# Patient Record
Sex: FEMALE | Race: WHITE | Employment: OTHER | URBAN - METROPOLITAN AREA
[De-identification: names, ages, dates, MRNs, and addresses within clinical notes are randomized per-mention and may not be internally consistent; named-entity substitution may affect disease eponyms.]

---

## 2021-03-02 ENCOUNTER — HOSPITAL ENCOUNTER (OUTPATIENT)
Dept: PREADMISSION TESTING | Age: 66
Discharge: HOME | End: 2021-03-02
Payer: MEDICARE

## 2021-03-02 PROCEDURE — U0005 INFEC AGEN DETEC AMPLI PROBE: HCPCS

## 2021-03-03 LAB — SARS-COV-2, COV2NT: NOT DETECTED

## 2021-03-05 RX ORDER — SODIUM CHLORIDE 0.9 % (FLUSH) 0.9 %
5-40 SYRINGE (ML) INJECTION AS NEEDED
Status: CANCELLED | OUTPATIENT
Start: 2021-03-05

## 2021-03-05 RX ORDER — SODIUM CHLORIDE 0.9 % (FLUSH) 0.9 %
5-40 SYRINGE (ML) INJECTION EVERY 8 HOURS
Status: CANCELLED | OUTPATIENT
Start: 2021-03-05

## 2021-03-05 RX ORDER — DEXTROMETHORPHAN/PSEUDOEPHED 2.5-7.5/.8
1.2 DROPS ORAL
Status: CANCELLED | OUTPATIENT
Start: 2021-03-05

## 2021-03-05 RX ORDER — ATROPINE SULFATE 0.1 MG/ML
0.5 INJECTION INTRAVENOUS
Status: CANCELLED | OUTPATIENT
Start: 2021-03-05 | End: 2021-03-06

## 2021-03-05 RX ORDER — EPINEPHRINE 0.1 MG/ML
1 INJECTION INTRACARDIAC; INTRAVENOUS
Status: CANCELLED | OUTPATIENT
Start: 2021-03-05 | End: 2021-03-06

## 2021-03-05 RX ORDER — DIPHENHYDRAMINE HYDROCHLORIDE 50 MG/ML
50 INJECTION, SOLUTION INTRAMUSCULAR; INTRAVENOUS ONCE
Status: CANCELLED | OUTPATIENT
Start: 2021-03-05 | End: 2021-03-05

## 2021-03-08 ENCOUNTER — HOSPITAL ENCOUNTER (OUTPATIENT)
Age: 66
Setting detail: OUTPATIENT SURGERY
Discharge: HOME | End: 2021-03-08
Attending: INTERNAL MEDICINE | Admitting: INTERNAL MEDICINE
Payer: MEDICARE

## 2021-03-08 VITALS
TEMPERATURE: 98.2 F | WEIGHT: 166.3 LBS | DIASTOLIC BLOOD PRESSURE: 51 MMHG | HEIGHT: 61 IN | RESPIRATION RATE: 15 BRPM | BODY MASS INDEX: 31.4 KG/M2 | HEART RATE: 75 BPM | SYSTOLIC BLOOD PRESSURE: 109 MMHG | OXYGEN SATURATION: 94 %

## 2021-03-08 LAB — GLUCOSE BLD STRIP.AUTO-MCNC: 96 MG/DL (ref 70–110)

## 2021-03-08 PROCEDURE — 82962 GLUCOSE BLOOD TEST: CPT

## 2021-03-08 PROCEDURE — 77030040361 HC SLV COMPR DVT MDII -B: Performed by: INTERNAL MEDICINE

## 2021-03-08 PROCEDURE — 76040000008

## 2021-03-08 PROCEDURE — 77030039961 HC KT CUST COLON BSC -D: Performed by: INTERNAL MEDICINE

## 2021-03-08 PROCEDURE — 76040000008: Performed by: INTERNAL MEDICINE

## 2021-03-08 PROCEDURE — 77030021593 HC FCPS BIOP ENDOSC BSC -A: Performed by: INTERNAL MEDICINE

## 2021-03-08 PROCEDURE — 99153 MOD SED SAME PHYS/QHP EA: CPT | Performed by: INTERNAL MEDICINE

## 2021-03-08 PROCEDURE — G0500 MOD SEDAT ENDO SERVICE >5YRS: HCPCS | Performed by: INTERNAL MEDICINE

## 2021-03-08 PROCEDURE — 88305 TISSUE EXAM BY PATHOLOGIST: CPT

## 2021-03-08 PROCEDURE — 2709999900 HC NON-CHARGEABLE SUPPLY: Performed by: INTERNAL MEDICINE

## 2021-03-08 PROCEDURE — 74011250636 HC RX REV CODE- 250/636: Performed by: INTERNAL MEDICINE

## 2021-03-08 RX ORDER — METOPROLOL SUCCINATE 25 MG/1
25 TABLET, EXTENDED RELEASE ORAL DAILY
COMMUNITY
End: 2021-07-06

## 2021-03-08 RX ORDER — NALOXONE HYDROCHLORIDE 0.4 MG/ML
0.4 INJECTION, SOLUTION INTRAMUSCULAR; INTRAVENOUS; SUBCUTANEOUS
Status: DISCONTINUED | OUTPATIENT
Start: 2021-03-08 | End: 2021-03-08 | Stop reason: HOSPADM

## 2021-03-08 RX ORDER — ATORVASTATIN CALCIUM 20 MG/1
20 TABLET, FILM COATED ORAL DAILY
COMMUNITY

## 2021-03-08 RX ORDER — MONTELUKAST SODIUM 10 MG/1
10 TABLET ORAL DAILY
COMMUNITY

## 2021-03-08 RX ORDER — GLIMEPIRIDE 4 MG/1
4 TABLET ORAL
COMMUNITY

## 2021-03-08 RX ORDER — SODIUM CHLORIDE 9 MG/ML
1000 INJECTION, SOLUTION INTRAVENOUS CONTINUOUS
Status: DISCONTINUED | OUTPATIENT
Start: 2021-03-08 | End: 2021-03-08 | Stop reason: HOSPADM

## 2021-03-08 RX ORDER — LEVOTHYROXINE SODIUM 150 UG/1
150 TABLET ORAL
COMMUNITY

## 2021-03-08 RX ORDER — WARFARIN SODIUM 5 MG/1
5 TABLET ORAL DAILY
COMMUNITY

## 2021-03-08 RX ORDER — MIDAZOLAM HYDROCHLORIDE 1 MG/ML
.25-5 INJECTION, SOLUTION INTRAMUSCULAR; INTRAVENOUS
Status: DISCONTINUED | OUTPATIENT
Start: 2021-03-08 | End: 2021-03-08 | Stop reason: HOSPADM

## 2021-03-08 RX ORDER — ENOXAPARIN SODIUM 100 MG/ML
40 INJECTION SUBCUTANEOUS EVERY 12 HOURS
COMMUNITY

## 2021-03-08 RX ORDER — METFORMIN HYDROCHLORIDE 500 MG/1
500 TABLET ORAL
COMMUNITY

## 2021-03-08 RX ORDER — FLUMAZENIL 0.1 MG/ML
0.2 INJECTION INTRAVENOUS
Status: DISCONTINUED | OUTPATIENT
Start: 2021-03-08 | End: 2021-03-08 | Stop reason: HOSPADM

## 2021-03-08 RX ORDER — FENTANYL CITRATE 50 UG/ML
100 INJECTION, SOLUTION INTRAMUSCULAR; INTRAVENOUS
Status: DISCONTINUED | OUTPATIENT
Start: 2021-03-08 | End: 2021-03-08 | Stop reason: HOSPADM

## 2021-03-08 RX ADMIN — SODIUM CHLORIDE 1000 ML: 900 INJECTION, SOLUTION INTRAVENOUS at 11:37

## 2021-03-08 NOTE — H&P
Assessment/Plan  # Detail Type Description    1. Assessment Early satiety (R68.81). Patient Plan EGD ordered with Dr. Makayla Nance [facility] to evaluate upper GI tract for abnormalities that may explain symptoms, with biopsies as indicated. Explained risks of procedure to include bleeding, infection, reaction to sedation, and perforation with possible need for admission to the hospital, and in the most extensive of  circumstances, the patient may require surgery. Pt verbalized understanding of these risks and is agreeable with this procedure. Plan Orders Further diagnostic evaluations ordered today include(s) UPPER GI ENDOSCOPY, DIAGNOSIS to be performed. 2. Assessment Abnormal weight loss (R63.4). Patient Plan See above. 3. Assessment Diarrhea (R19.7). Patient Plan Stool studies ordered for diarrhea workup. Pt states the diarrhea began prior to entering the hospital; however, prior to procedures, need to rule out C.diff infection in context of recent 6 day hospital admission to [facility] for chest pain R/T GERD. Plan Orders C difficile Toxins A+B, EIA to be performed. , C-Reactive Protein, Quant to be performed. , Calprotectin, Fecal to be performed., Giardia, EIA; Ova/Parasite to be performed., Occult Blood, Fecal, IA to be performed., Ova + Parasite Exam to be performed. , Pancreatic Elastase, Fecal to be performed. and Stool Culture to be performed. 4. Assessment Personal history of colonic polyps (Z86.010). Patient Plan She is here 3 years overdue, for 3 year Recall for the surveillance of colonic adenomas last addressed in 2014 by Dr. Makayla Nance in the hospital setting at [facility]. One semi-pedunculated polyp (tubular adenoma) found and removed, and moderately severe diverticulosis with myochosis noted on this exam. Pt is aware that she is overdue for surveillance colonoscopy. This will be her second colonoscopy.     Second colonoscopy ordered with Dr. Kathrin Cespedes @[facility], with Suprep bowel prep. Stressed importance of following all bowel preparation instructions. Explained the procedure to the patient including all risks and benefits. These risks consist of missed lesions on exam, bleeding, and bowel perforation with possible need for admission to the hospital, and in the most extensive of  circumstances, the patient may require surgery. Pt verbalized understanding of these risks and is agreeable with this procedure. Plan Orders Further diagnostic evaluations ordered today include(s) DIAGNOSTIC COLONOSCOPY to be performed. 5. Assessment Type 2 diabetes mellitus without complication, without long-term current use of insulin (E11.9). Patient Plan Patient is advised not to take pills for diabetes the day ahead of the procedures  patient is advised (if insulin dependent) to take half of the usual long acting insulin the day before procedures (or as advised by the prescribing provider), and to follow  accuchecks closely, 'rescuing' for a blood sugar below 90.         6. Assessment TIA (transient ischemic attack) (G45.9). Patient Plan Latent stroke history from 8/2018, Coumadin used for anticoagulation for stroke prophylaxis as well as for cardiac indications. 7. Assessment Critical aortic valve stenosis (I35.0). Patient Plan Cardiac catheterization performed on 11/18/2020, found to be normal with EF of 55% confirmed on Echocardiogram. Will upload [facility] records to chart including hospital admission discharge summary, Cardiology consult note, and reports from Echo (11/16), Stress test (11/16), and Cardiac catheterization (11/18). Per 2015 ASGE guidelines, no antibiotics are required prior to procedures, as she has no history of infectious endocarditis. Patient is advised to Synthroid and Cardiac meds the am of procedures with sip clear liquid after prep         8.  Assessment S/P 21mm On-X Mechanical Valve AVR (aortic valve replacement) 10/15/12 (Z95.4). Patient Plan Hold Coumadin 5 days prior to procedures, pt to consult with Dr. Ching File regarding Lovenox bridge. Will request latest Cardiology note from Dr. Ching File. 9. Assessment Asthma, well controlled (J45.909). Patient Plan Bring all inhalers to procedures. 10. Assessment Hypothyroidism (acquired) (E03.9). Patient Plan Patient is advised to Synthroid and Cardiac meds the am of procedure with sip clear liquid after prep                 This 76year old  patient was referred by Krista Parker. This 76year old female presents for Hx polyp/colon cancer, Weight Loss and Diarrhea. History of Present Illness:  1. Hx polyp/colon cancer   Prior screening:  colonoscopy. Denies risk factors. Associated symptoms include abdominal pain, change in bowel habits, constipation, decreased appetite, diarrhea, nausea, vomiting and weight loss. Pertinent negatives include change in stool caliber, melena, rectal bleeding and weight gain. Additional information: No family history of colon cancer, last colonoscopy was 12/4/14 hx of polyps adenomas and BM 2x daily . father prostate cancer. for two days pt was constipated. pt lost around 12 lbs over two months. 12/4/2014 - Colonoscopy  Dr. Mónica Suresh  1.8cm Semi-pedunculated polyp, hot snared - TUBULAR ADENOMA  Moderately severe diverticulosis w/ myochosis in sigmoid colon  High fiber diet, 3 year Recall  2. Weight Loss   The patient has lost 25lbs (11.36kgs) over a period of 2 month(s). The patient is losing weight. The context of the weight loss includes Recent hospital admission. Factors resulting in weight stabilization or gain include: medications and Metformin. The risk factors for weight loss include diabetes and history of stroke. Associated symptoms include constipation, decreased appetite, diaphoresis, diarrhea, vomiting, Early satiety, nausea, vertigo, lightheadedness, SOB and decreased appetite. Pertinent negatives include cold intolerance, heat intolerance and irregular heartbeat/palpitations. Additional information: Appetite improved after Cortisone shot, and decreased dosage of Metformin. SOB has also since improved. 3.  Diarrhea   Onset: 2 months ago. Severity level is: mild-moderate. Stool frequency: 3 times a day. The  describes it as loose, watery and brown. It occurs daily. The problem is improving. She denies aggravating factors. Associated symptoms include change in appetite, cramping (abdominal), flatulence and Belching. Pertinent negatives include abdominal pain, anorexia, bloating, blood in stool, decreased urine output, distention (abdominal), fecal incontinence, fever, joint pain, nausea, rash, tenesmus, vomiting and weight loss. Additional information: no family history of colon cancer, Since cortisone shot and and decreased Metformin dosage she has had constipation for 2 days.           PROBLEM LIST:     Problem Description Onset Date Chronic Clinical Status Notes   Asthma, well controlled 11/12/2018 N     TIA (transient ischemic attack) 08/22/2018 N     S/P 21mm On-X Mechanical Valve AVR (aortic valve replacement) 10/15/12 10/15/2012 N     Type 2 diabetes mellitus without complication, without long-term current use of insulin 10/11/2012 N     Obesity (BMI 30-39.9) 10/11/2012 N     Hypothyroidism (acquired) 10/11/2012 N     Reactive airway disease 10/09/2012 N     Critical aortic valve stenosis 10/11/2012 N     Unstable angina 11/14/2020 N     Cerebrovascular accident (CVA) due to embolism 11/14/2020 N               PAST MEDICAL/SURGICAL HISTORY   (Detailed)    Disease/disorder Onset Date Management Date Comments   Stroke 08/2018 Coumadin     1.8cm semi-pedunculated tubular adenoma 12/04/2014 Hot snare polypectomy     Diverticular disease 2014      CAD  Aortic valve replacement, Coumadin 10/15/2012    Vertigo         C-Sections (x5)     Hypothyroidism  Synthroid     Diabetes Metformin, Glipizide, Xanthic once a week       BTL           Family History  (Detailed)  Relationship Family Member Name  Age at Death Condition Onset Age Cause of Death   Father    Prostate cancer  N   Natural Father    Heart Failure  N   Natural Father    Kidney Disease  N   Natural Mother    Emphysema  N         Social History:  (Detailed)  Tobacco use reviewed. Preferred language is [place]. MARITAL STATUS/FAMILY/SOCIAL SUPPORT  Marital status:    CHILDREN  Does not have children. Smoking status: Never smoker. ALCOHOL  There is a history of alcohol use. Type: Beer. 1 glass consumed rarely. CAFFEINE  The patient uses caffeine: soda - 2 cups a day. LIFESTYLE  Moderate activity level. Health club member. Exercise includes weights. Exercises 3-4 times a week. SLEEP PATTERNS  Patient has no changes to sleep patterns. Medications (active prior to today)  Medication Name Sig Description Start Date Stop Date Refilled Rx Elsewhere   Pain Relief Regular Strength 325 mg tablet Take 1 Tab by Mouth Every 4 Hours As Needed. 10/07/2018 2020  Y   Proventil HFA 90 mcg/actuation aerosol inhaler Take 2 Puffs inhaled by mouth Every 4 Hours As Needed for Wheezing or Shortness of Breath. 2020  Y   aspirin 81 mg chewable tablet Take 1 Tab by Mouth Once a Day. 2018  Y   Lipitor 40 mg tablet Take 1 Tab by Mouth Every Night at Bedtime. 2018   Y   All Day Allergy (cetirizine) 10 mg tablet Take 10 mg by Mouth Daily as needed for Other (allergies). /2020  Y   Lovenox 150 mg/mL subcutaneous syringe Inject 0.9 mL beneath the skin Every 24 Hours.  2020   Y   Amaryl 4 mg tablet Take 4 mg by Mouth Daily in A.M. /2020  Y   Synthroid 175 mcg tablet Take 175 mcg by Mouth Once a Day. 2020  Y   Glucophage 500 mg tablet Take 1,000 mg by Mouth 2 Times Daily with Meals. 2020  Y   Toprol XL 25 mg tablet,extended release Take 25 mg by Mouth Once a Day. // 12/30/2020  Y   Singulair 10 mg tablet Take 1 Tab by Mouth Once a Day. 08/23/2017   Y   Protonix 40 mg tablet,delayed release Take 40 mg by Mouth Once a Day. // 12/30/2020  Y   Ozempic 0.25 mg or 0.5 mg (2 mg/1.5 mL) subcutaneous pen injector Inject 0.5 mg beneath the skin Every 7 Days. // 12/30/2020  Y   warfarin (COUMADIN) 5 mg PO TABS Take 5 mg by Mouth See Admin Instrs. Take 1 tab (5mg) every day except take 0.5 tab (2.5mg) on Tuesdays and Thursdays. //   Y   levothyroxine 150 mcg capsule take 1 capsule by oral route  every day //   Y     Patient Status   Completed with information received for patient in a summary of care record. Medication Reconciliation  Medications reconciled today. Medication Reviewed  Adherence Medication Name Sig Desc Elsewhere Status   taking as directed Lipitor 40 mg tablet Take 1 Tab by Mouth Every Night at Bedtime. Y Verified   taking as directed Lovenox 150 mg/mL subcutaneous syringe Inject 0.9 mL beneath the skin Every 24 Hours. Y Verified   taking as directed Singulair 10 mg tablet Take 1 Tab by Mouth Once a Day. Y Verified   taking as directed warfarin (COUMADIN) 5 mg PO TABS Take 5 mg by Mouth See Admin Instrs. Take 1 tab (5mg) every day except take 0.5 tab (2.5mg) on Tuesdays and Thursdays. Y Verified   taking as directed levothyroxine 150 mcg capsule take 1 capsule by oral route  every day Y Verified     Medications (Added, Continued or Stopped today)  Start Date Medication Directions PRN Status PRN Reason Instruction Stop Date    All Day Allergy (cetirizine) 10 mg tablet Take 10 mg by Mouth Daily as needed for Other (allergies). N   12/30/2020    Amaryl 4 mg tablet Take 4 mg by Mouth Daily in A.M. N   12/30/2020 08/25/2018 aspirin 81 mg chewable tablet Take 1 Tab by Mouth Once a Day. N   12/30/2020    Glucophage 500 mg tablet Take 1,000 mg by Mouth 2 Times Daily with Meals.  N   12/30/2020    levothyroxine 150 mcg capsule take 1 capsule by oral route  every day N      08/25/2018 Lipitor 40 mg tablet Take 1 Tab by Mouth Every Night at Bedtime. N      11/19/2020 Lovenox 150 mg/mL subcutaneous syringe Inject 0.9 mL beneath the skin Every 24 Hours. N       Ozempic 0.25 mg or 0.5 mg (2 mg/1.5 mL) subcutaneous pen injector Inject 0.5 mg beneath the skin Every 7 Days. N   12/30/2020   10/07/2018 Pain Relief Regular Strength 325 mg tablet Take 1 Tab by Mouth Every 4 Hours As Needed. N   12/30/2020    Protonix 40 mg tablet,delayed release Take 40 mg by Mouth Once a Day. N   12/30/2020 11/19/2020 Proventil HFA 90 mcg/actuation aerosol inhaler Take 2 Puffs inhaled by mouth Every 4 Hours As Needed for Wheezing or Shortness of Breath. N   12/30/2020 08/23/2017 Singulair 10 mg tablet Take 1 Tab by Mouth Once a Day. N      12/30/2020 Suprep Bowel Prep Kit 17.5 gram-3.13 gram-1.6 gram oral solution Take as directed. N       Synthroid 175 mcg tablet Take 175 mcg by Mouth Once a Day. N   12/30/2020    Toprol XL 25 mg tablet,extended release Take 25 mg by Mouth Once a Day. N   12/30/2020    warfarin (COUMADIN) 5 mg PO TABS Take 5 mg by Mouth See Admin Instrs. Take 1 tab (5mg) every day except take 0.5 tab (2.5mg) on Tuesdays and Thursdays. N        Allergies:  Ingredient Reaction (Severity) Medication Name Comment   ERYTHROMYCIN BASE other/intolerance (Unknown)     LIDOCAINE HCL other/intolerance (Unknown)     Reviewed, no changes. ORDERS:  Status Lab Order Time Frame Comments   ordered UPPER GI ENDOSCOPY, DIAGNOSIS     ordered DIAGNOSTIC COLONOSCOPY     ordered C difficile Toxins A+B, EIA     ordered Calprotectin, Fecal     ordered C-Reactive Protein, Quant     ordered Giardia, EIA; Ova/Parasite     ordered Occult Blood, Fecal, IA     ordered Ova + Parasite Exam     ordered Stool Culture     ordered Pancreatic Elastase, Fecal       Review of System  System Neg/Pos Details   Constitutional Negative Fever, Weight gain and Weight loss.    ENMT Negative Sinus Infection. Eyes Negative Double vision. Respiratory Negative Asthma, Chronic cough and Dyspnea. Cardio Negative Chest pain, Edema and Irregular heartbeat/palpitations. GI Positive Abdominal cramping, Change in appetite, Flatulence. GI Negative Abdominal distention, Abdominal pain, Anorexia, Bloating, Blood in stool, Change in bowel habits, Change in stool caliber, Constipation, Decreased appetite, Diarrhea, Dysphagia, Fecal incontinence, Heartburn, Hematemesis, Hematochezia, Melena, Nausea, Rectal bleeding, Reflux, Tenesmus and Vomiting.  Negative Decreased urine output, Dysuria and Hematuria. Endocrine Positive Diaphoresis. Endocrine Negative Cold intolerance and Heat intolerance. Neuro Negative Dizziness, Headache, Numbness and Tremors. Psych Negative Anxiety, Depression and Increased stress. Integumentary Negative Hives, Pruritus and Rash. MS Negative Back pain, Joint pain and Myalgia. Hema/Lymph Negative Easy bleeding, Easy bruising and Lymphadenopathy. Allergic/Immuno Negative Food allergies and Immunosuppression. Vital Signs   Height  Time ft in cm Last Measured Height Position   2:06 PM 5.0 1.00 154.94 12/30/2020 Standing     MAP (Calculated) Arterial Line 1 BP (mmHg) BP Patient Position Resp SpO2 O2 Device O2 Flow Rate (L/min) Pre/Post Ductal Weight       03/08/21 1151 98.6 °F (37 °C) 77 130/61 84 -- -- 16 98 % Room air -- -- 75.4 kg (166 lb 4.8 oz)     PHYSICAL EXAM:  Exam Findings Details   Constitutional Normal Well developed. Eyes Normal Conjunctiva - Right: Normal, Left: Normal. Sclera - Right: Normal, Left: Normal.   Nasopharynx Normal Lips/teeth/gums - Normal.   Neck Exam Normal Inspection - Normal.   Respiratory Normal Inspection - Normal.   Cardiovascular Normal Rhythm - Regular. Extra sounds - None. Murmurs - None.    Vascular Normal Pulses - Brachial: Normal.   Abdomen Normal Inspection - Normal. Appliance(s) - Normal. Auscultation - Normal. Percussion - Normal. Anterior palpation - No guarding. No abdominal tenderness. No hepatic enlargement. No hernia. No Ascites. Skin Normal Inspection - Normal.   Musculoskeletal Normal Hands/Wrist - Right: Normal, Left: Normal.   Extremity Normal No edema. Neurological Normal Fine motor skills - Normal.   Psychiatric Normal Orientation - Oriented to time, place, person & situation. Appropriate mood and affect.          Active Patient Care Team Members    Name Contact Agency Type Support Role Relationship Active Date Inactive Date Specialty   [facility]   Patient provider PCP      Aaliyah Enriquez   encounter provider    Gastroenterology         Encounter submitted for review by Aaliyah REECE on 01/02/2021 2:15 PM.             No change in H&P

## 2021-03-08 NOTE — PROCEDURES
(EGD) Esophagogastroduodenoscopy (UPPER ENDOSCOPY) Procedure Note  [facility] [place]  __________________________________________________________________________________________________________________________      3/8/2021     Patient: Alexandra Alaniz YOB: 1955 Gender: female Age: 76 y.o. INDICATION:  Diabetic female who has been c/o nausea and vomiting within 40 minutes after eating with weight loss and mild diarrhea. Tried Pantoprazole but it didn't change anything. All her symptoms recently resolved after stopping the metformin. Now she has intermittent constipation. : Imer Harper MD    Referring Provider:  UNKNOWN    Sedation:  Versed 5 mg IV, Fentanyl 150 mcg IV  Procedure Details:  After infomed consent was obtained for the procedure, with all risks and benefits of procedure explained to the family the patient was taken to the endoscopy suite and placed in the left lateral decubitus position. Following sequential administration of sedation as per above, the endoscope was inserted into the mouth and advanced under direct vision to the proximal jejunum. A careful inspection was made as the gastroscope was withdrawn, including a retroflexed view of the proximal stomach; findings and interventions are described below. OROPHARYNX: The vocal Cords and the larynx are normal.   ESOPHAGUS: The proximal, mid, and distal oesophagus are normal. The Z-Line is intact. There is a tiny < 1 cm hiatal hernia. The diaphragmatic hiatus is located at 39 cm. STOMACH:  Mild diffuse non specific gaastritis. 5 biopsies taken. No food or liquid retention found. The fundus on antegrade and retroflex views is normal. The cardia, body, lesser curvature, greater curvature, the antrum, and pylorus are normal.   DUODENUM/ JEJUNUM:  A wide spread benign xanthoma or lymphoid plaque in the distal D2 biopsies are taken from that lesion and random from the duodenum.  The bulb, third, fourth portions, the proximal jejunum and major papilla are normal.  Therapies:  Duodenal and gastric biopsies    Specimen: Two           Complications:   None    EBL:  Negligible  IMPRESSION: The Z-Line is intact. There is a tiny < 1 cm hiatal hernia. Mild diffuse non specific gaastritis. 5 biopsies taken. No food or liquid retention found. Presence of a wide spread benign xanthoma or lymphoid plaque in the distal D2 > 1 cm. Biopsies are taken from that lesion and random from the duodenum. Nothing serious or concerning was found. RECOMMENDATION:  may resume diabetic diet. Avoid NSAID's. Make a FU appointment at the office.  Start taking OTC antacids as needed for heartburns or indigestion     Assistant: None    --Catalina Krishnan MD on 3/8/2021 at 1:13 PM

## 2021-03-08 NOTE — PROCEDURES
[facility]  Colonoscopy Procedure Report  _______________________________________________________  Patient: Romelia Veronica                                        Attending Physician: Zelda Lovell MD    Patient ID: [id number]                                    Referring Physician: Cody Sanders    Exam Date: 3/8/2021      Introduction: A  76 y.o. female patient, presents for inpatient Colonoscopy    Indications: History of 18 mm semipedunculated sigmoid adenoma removed in 2014 by Dr Екатерина Marquez. No colonoscopy since. She was having mild diarrhea with weight loss but it resolved after stopping the Metformin and presently she is more regular with intermittent constipation. She is On Coumadin and Lovenox because of prosthetic valve. No Family history of colon cancer. Stool testing including C diff, calprotectine, fecal pancreatic elastase, occult blood, culture O&P all negative. Daughter has Crohn's disease    Consent: The benefits, risks, and alternatives to the procedure were discussed and informed consent was obtained from the patient. Preparation: EKG, pulse, pulse oximetry and blood pressure were monitored throughout the procedure. ASA Classification: Class II- . The heart is an S1-S2 and regular heart rate and rhythm. Lungs are clear to auscultation and percussion. Abdomen is soft, nondistended, and nontender. Mental Status: awake, alert, and oriented to person, place, and time    Medications:  · Previously sedated. She received an additional Fentanyl 50 mcg IV and Versed 3 mg IV throughout the procedure. Rectal Exam: Normal Rectal Exam. No Blood. Pathology Specimens:  1    Procedure: The pediatric colonoscope was passed with ease through the anus under direct visualization and advanced to the cecum and >7 cm inside the terminal ileum. The scope was withdrawn and the mucosa was carefully examined. The quality of the preparation was excellent. The views were excellent.  The patient's toleration of the procedure was excellent. The exam was done twice to the cecum. Retroflexion is made in the ascending colon and in the rectum. Total time is 19 minutes and withdrawal time is 14 minutes. Findings:    Rectum:   Tiny internal hemorrhoids. Sigmoid:   Slightly tortuous sigmoid colon with moderate sigmoid diverticulosis and muscular hypertonia. No residual scar or polyp is found in the sigmoid. Descending Colon:   Mild diverticulosis in the descending colon. Transverse Colon:   Mild diverticulosis in the transverse colon. A small 2 mm sessile polyp in the proximal transverse colon, removed with cold forceps  Ascending Colon:   Normal   Cecum:   Normal   Terminal Ileum:   Normal      Unplanned Events: There were no unplanned events. Estimated Blood Loss: None  Impressions: Tiny internal hemorrhoids. Slightly tortuous sigmoid colon with moderate sigmoid diverticulosis and muscular hypertonia. No residual polyp in the sigmoid. Mild diverticulosis in the descending and transverse colon. A small 2 mm sessile polyp in the proximal transverse colon, removed with cold forceps but no other polyps found. The exam is well done. Normal Mucosa. No blood or AVM found. Complications: None; patient tolerated the procedure well. Recommendations:  · Discharge home when standard parameters are met. · Resume a high fiber diet. · Resume own medications. Avoid all NSAID's. Resume  Coumadin immediately and Lovenox tomorrow AM  · Colonoscopy recommendation in 7 years.   · Take Miralax and/ or Colace 100 mg on regular basis if constipated    Procedure Codes:    · COLONOSCOPY,BIOPSY [[id number]]    Endoscope Information:  Model Number(s)    [id number]   Assistant: None  Signed By: Joshua Oshea MD Date: 3/8/2021

## 2021-03-08 NOTE — DISCHARGE INSTRUCTIONS
Micaela Everett  [id number]  1955    COLON / EGD DISCHARGE INSTRUCTIONS    Discomfort:  Sore throat- throat lozenges or warm salt water gargle  Redness at IV site- apply warm compress to area; if redness or soreness persist- contact your physician  There may be a slight amount of blood passed from the rectum  Gaseous discomfort- walking, belching will help relieve any discomfort  You may not operate a vehicle until next day  You may not engage in an occupation involving machinery or appliances until next day  You may not drink alcoholic beverages until next day  Avoid making any critical decisions for at least 24 hour    DIET:   High fiber and anti reflux diet. ACTIVITY:  You may not  resume your normal daily activities until the next day, it is recommended that you spend the remainder of the day resting -  avoid any strenuous activity. CALL M.D.  IF ANY SIGN OF:   Increasing pain, nausea, vomiting  Abdominal distension (swelling)  New increased bleeding (oral or rectal)  Fever (chills)  Pain in chest area  Bloody discharge from nose or mouth  Shortness of breath     You may never take any Advil, Aspirin, Ibuprofen, Motrin, Aleve, or Goodys ONLY  Tylenol as needed for pain. Resume the Coumadin now and the Lovenox tomorrow am.      Follow-up Instructions: Follow-up in the office as scheduled or make a follow-up appointment in 2 weeks.     Tracie Andrews MD  March 8, 2021

## (undated) DEVICE — SPONGE GZ W4XL4IN RAYON POLY 4 PLY NONWOVEN FASTER WICKING

## (undated) DEVICE — MAJ-1414 SINGLE USE ADPATER BIOPSY VALV: Brand: SINGLE USE ADAPTOR BIOPSY VALVE

## (undated) DEVICE — KENDALL RADIOLUCENT FOAM MONITORING ELECTRODE RECTANGULAR SHAPE: Brand: KENDALL

## (undated) DEVICE — WRISTBAND ID AD W2.5XL9.5CM RED VYN ADH CLSR UNI-PRINT

## (undated) DEVICE — MOUTHPIECE ENDOSCP 20X27MM --

## (undated) DEVICE — SOLUTION IV 500ML 0.9% SOD CHL FLX CONT

## (undated) DEVICE — GARMENT,MEDLINE,DVT,INT,CALF,MED, GEN2: Brand: MEDLINE

## (undated) DEVICE — SYR 5ML 1/5 GRAD LL NSAF LF --

## (undated) DEVICE — SPONGE GZ W4XL4IN COT 12 PLY TYP VII WVN C FLD DSGN

## (undated) DEVICE — TRNQT TEXT 1X18IN BLU LF DISP -- CONVERT TO ITEM 362165

## (undated) DEVICE — CATH IV SAFE STR 22GX1IN BLU -- PROTECTIV PLUS

## (undated) DEVICE — TRAP SPEC COLL POLYP POLYSTYR --

## (undated) DEVICE — REM POLYHESIVE ADULT PATIENT RETURN ELECTRODE: Brand: VALLEYLAB

## (undated) DEVICE — SET ADMIN 16ML TBNG L100IN 2 Y INJ SITE IV PIGGY BK DISP

## (undated) DEVICE — Device

## (undated) DEVICE — NDL PRT INJ NSAF BLNT 18GX1.5 --

## (undated) DEVICE — FORCEPS BX CAP 240CM L RAD JAW 4

## (undated) DEVICE — CANNULA CUSH AD W/ 14FT TBG

## (undated) DEVICE — TUBING, SUCTION, 1/4" X 12', STRAIGHT: Brand: MEDLINE

## (undated) DEVICE — CATH SUC CTRL PRT TRIFLO 14FR --

## (undated) DEVICE — NDL FLTR TIP 5 MIC 18GX1.5IN --

## (undated) DEVICE — SYRINGE 50ML E/T

## (undated) DEVICE — SINGLE PORT MANIFOLD: Brand: NEPTUNE 2

## (undated) DEVICE — SYR 3ML LL TIP 1/10ML GRAD --